# Patient Record
Sex: MALE | Race: WHITE | NOT HISPANIC OR LATINO | Employment: FULL TIME | URBAN - METROPOLITAN AREA
[De-identification: names, ages, dates, MRNs, and addresses within clinical notes are randomized per-mention and may not be internally consistent; named-entity substitution may affect disease eponyms.]

---

## 2018-12-02 ENCOUNTER — HOSPITAL ENCOUNTER (OUTPATIENT)
Dept: RADIOLOGY | Facility: MEDICAL CENTER | Age: 28
End: 2018-12-02
Attending: NURSE PRACTITIONER
Payer: COMMERCIAL

## 2018-12-02 ENCOUNTER — OFFICE VISIT (OUTPATIENT)
Dept: URGENT CARE | Facility: MEDICAL CENTER | Age: 28
End: 2018-12-02
Payer: COMMERCIAL

## 2018-12-02 VITALS
SYSTOLIC BLOOD PRESSURE: 118 MMHG | TEMPERATURE: 98.7 F | BODY MASS INDEX: 24.51 KG/M2 | DIASTOLIC BLOOD PRESSURE: 68 MMHG | HEART RATE: 61 BPM | HEIGHT: 74 IN | WEIGHT: 191 LBS | OXYGEN SATURATION: 99 %

## 2018-12-02 DIAGNOSIS — S20.211A CONTUSION OF RIGHT CHEST WALL, INITIAL ENCOUNTER: ICD-10-CM

## 2018-12-02 DIAGNOSIS — M25.512 ACUTE PAIN OF LEFT SHOULDER: ICD-10-CM

## 2018-12-02 DIAGNOSIS — R07.89 ANTERIOR CHEST WALL PAIN: ICD-10-CM

## 2018-12-02 DIAGNOSIS — S43.402A SPRAIN OF LEFT SHOULDER, UNSPECIFIED SHOULDER SPRAIN TYPE, INITIAL ENCOUNTER: ICD-10-CM

## 2018-12-02 PROCEDURE — 73030 X-RAY EXAM OF SHOULDER: CPT | Mod: LT

## 2018-12-02 PROCEDURE — 71046 X-RAY EXAM CHEST 2 VIEWS: CPT

## 2018-12-02 PROCEDURE — 99203 OFFICE O/P NEW LOW 30 MIN: CPT | Performed by: NURSE PRACTITIONER

## 2018-12-03 ASSESSMENT — ENCOUNTER SYMPTOMS
SHORTNESS OF BREATH: 0
WHEEZING: 0
CHILLS: 0
FALLS: 1
HEADACHES: 0
SENSORY CHANGE: 0
LOSS OF CONSCIOUSNESS: 0
NECK PAIN: 0
ORTHOPNEA: 0
SPUTUM PRODUCTION: 0
FOCAL WEAKNESS: 0
BACK PAIN: 0
DIZZINESS: 0
COUGH: 0
DIAPHORESIS: 0
FEVER: 0
WEAKNESS: 0
TINGLING: 0
SPEECH CHANGE: 0
PALPITATIONS: 0
HEMOPTYSIS: 0

## 2018-12-03 NOTE — PROGRESS NOTES
"Subjective:      Rivera Comer is a 28 y.o. male who presents with Arm Injury (crashed snowboarding at 10 AM, cant move it, pain from shoulder to sternum and ribs)            Patient reports that he was snowboarding today.  He fell and landed on his left arm and chest wall.  He notes left shoulder pain with any movement, as well as right anterior chest wall pain.  He has not taken any medicine to treat the symptoms.  No shortness of breath.  He was wearing a helmet.  He did not strike his head or lose consciousness.  He has a history of right AC joint separation but nothing on the left.  Rates pain 8/10.        Review of Systems   Constitutional: Negative for chills, diaphoresis, fever and malaise/fatigue.   Respiratory: Negative for cough, hemoptysis, sputum production, shortness of breath and wheezing.    Cardiovascular: Positive for chest pain. Negative for palpitations, orthopnea and leg swelling.   Musculoskeletal: Positive for falls and joint pain. Negative for back pain and neck pain.   Neurological: Negative for dizziness, tingling, sensory change, speech change, focal weakness, loss of consciousness, weakness and headaches.     Medications, Allergies, and current problem list reviewed today in Epic     Objective:     /68 (BP Location: Right arm, Patient Position: Sitting, BP Cuff Size: Adult)   Pulse 61   Temp 37.1 °C (98.7 °F) (Temporal)   Ht 1.88 m (6' 2\")   Wt 86.6 kg (191 lb)   SpO2 99%   BMI 24.52 kg/m²      Physical Exam   Constitutional: He is oriented to person, place, and time. He appears well-developed and well-nourished. He appears distressed.   Eyes: Pupils are equal, round, and reactive to light. EOM are normal.   Neck: Normal range of motion. Neck supple. No JVD present. No tracheal deviation present. No thyromegaly present.   Cardiovascular: Normal rate, regular rhythm and normal heart sounds.  Exam reveals no gallop and no friction rub.    No murmur heard.  Pulmonary/Chest: " Effort normal and breath sounds normal. No stridor. No respiratory distress. He has no wheezes. He has no rales. He exhibits tenderness. He exhibits no mass, no laceration, no crepitus, no edema, no deformity, no swelling and no retraction.       Right anterior chest wall is warm, dry, and intact with no bruising, swelling, erythema, rash or lesion.  Generalized TTP with wincing on palpation.   Musculoskeletal:        Left shoulder: He exhibits decreased range of motion, tenderness, bony tenderness, pain, spasm and decreased strength. He exhibits no swelling, no effusion, no crepitus, no deformity, no laceration and normal pulse.        Arms:  Left shoulder is warm, dry, and intact with no bruising, swelling, erythema, rash or lesion. Diffuse TTP with palpable trapezius muscle spasm at joint attachment.  No clavicle or scapula TTP.  Patient reluctant to perform ROM secondary to pain.  Distal NV, sensation and strength intact.   intact.  Cap refill < 2 seconds.   Lymphadenopathy:     He has no cervical adenopathy.   Neurological: He is alert and oriented to person, place, and time.   Skin: He is not diaphoretic.   Vitals reviewed.         [facility]  [id number]  [place] [address] Rivera Comer  MRN: [id number], : 1990, Sex: M  Adm: 2018, D/C: 2018   Order   DX-SHOULDER 2+ LEFT [[id number]] (Order [id number])   Reviewed by PARUL Rosario on 2018  3:11 PM   Images     Show images for DX-SHOULDER 2+ LEFT   View [employer]     DX-SHOULDER 2+ LEFT (Order #[id number]) on 18   Narrative       2018 2:35 PM    HISTORY/REASON FOR EXAM:  ; Pain/Deformity Following Trauma;  Pain/Deformity Following Trauma      TECHNIQUE/EXAM DESCRIPTION AND NUMBER OF VIEWS:  3 views of the LEFT shoulder.    COMPARISON: None    FINDINGS:  There is no evidence of fracture or dislocation. Glenohumeral and acromioclavicular articulation is maintained. Sclerotic  focus in the proximal left humerus likely represents a bone island. Visualized left lung field is clear. There is a sclerotic focus   involving the third rib on the left likely representing a bone island.     Impression       No evidence of acute fracture or dislocation.   Reading Provider Reading Date   Mirta Taveras M.D. Dec 2, 2018   Signing Provider Signing Date Signing Time   Mirta Taveras M.D. Dec 2, 2018  2:59 PM   Lab Information     Lab   RADIOLOGY              Last Resulted Time   Sun Dec 2, 2018  3:01 PM   Detailed Information     Priority and Order Details           Collection Information     Resulting Agency: RADIOLOGY      Order-Level Documents:     There are no order-level documents.   Order Detail Report     DX-SHOULDER 2+ LEFT (Order #[id number]) on 18   Order-Level Documents:     There are no order-level documents.   Encounter-Level Documents:     Scan on 2018 2:28 PM by Rosales Whitecan on 2018 2:28 PM by Rosales Bennett          Order-Level Documents for Parent Order:     There are no order-level documents for parent order.   Encounter-Level Documents for Parent Order:     There are no encounter-level documents for parent order.   DX-SHOULDER 2+ LEFT [[id number]]     Electronically signed by: PARUL Wolfe on 18 1405 Status: Completed   Ordering user: PARUL Wolfe 18 1405 Authorized by: PARUL Wolfe   Frequency:  18 1425 - 1  Occurrences   Diagnoses  Acute pain of left shoulder [M25.512]   Questionnaire     Question Answer   Left/Right Indicator: LEFT   Reason For Exam: Pain/Deformity Following Trauma   Comments to Radiology Fell while snowboarding.  Anterior and superior shoulder region pain.     [facility]  [id number]  [place] [address] Rivera Comer  MRN: [id number], : 1990, Sex: M  Adm: 2018, D/C: 2018   Order   DX-CHEST-2 VIEWS [[id number]] (Order  [id number])   Reviewed by PARUL Rosario on 12/2/2018  3:11 PM   Images     Show images for DX-CHEST-2 VIEWS   View [employer]     DX-CHEST-2 VIEWS (Order #[id number]) on 12/2/18   Narrative       12/2/2018 2:34 PM    HISTORY/REASON FOR EXAM:  Pain following trauma.    TECHNIQUE/EXAM DESCRIPTION:  PA and lateral views of the chest.    COMPARISON:  None    FINDINGS:    The heart is not enlarged. No focal consolidation, pleural effusion or pneumothorax is identified.  Costophrenic angles are clear.   Impression       No acute cardiopulmonary process is identified.   Reading Provider Reading Date   Mirta Taveras M.D. Dec 2, 2018   Signing Provider Signing Date Signing Time   Mirta Taveras M.D. Dec 2, 2018  2:58 PM   Lab Information     Lab   RADIOLOGY              Last Resulted Time   Sun Dec 2, 2018  3:00 PM   Detailed Information     Priority and Order Details           Collection Information     Resulting Agency: RADIOLOGY      Order-Level Documents:     There are no order-level documents.   Order Detail Report     DX-CHEST-2 VIEWS (Order #[id number]) on 12/2/18   Order-Level Documents:     There are no order-level documents.   Encounter-Level Documents:     There are no encounter-level documents.   Order-Level Documents for Parent Order:     There are no order-level documents for parent order.   Encounter-Level Documents for Parent Order:     There are no encounter-level documents for parent order.   DX-CHEST-2 VIEWS [[id number]]     Electronically signed by: PARUL Wolfe on 12/02/18 1405 Status: Completed   Ordering user: PARUL Wolfe 12/02/18 1405 Authorized by: PARUL Wolfe   Frequency:  12/02/18 1424 - 1  Occurrences   Diagnoses  Anterior chest wall pain [R07.89]   Questionnaire     Question Answer   Reason For Exam: Pain Following Trauma   Comments to Radiology Right anterior chest wall pain after fall while snowboarding.           Assessment/Plan:     1. Sprain of left shoulder, unspecified shoulder sprain type, initial encounter    2. Contusion of right chest wall, initial encounter    3. Acute pain of left shoulder  - DX-SHOULDER 2+ LEFT; Future    4. Anterior chest wall pain  - DX-CHEST-2 VIEWS; Future    Discussed exam findings with patient and reviewed imaging results.  Differential discussed.  OTC analgesics, ice, and rest prn comfort measures.  Arm sling/shoulder immobilizer provided for short term use not to exceed 3 days.  While using shoulder immobilizer, perform non-weight bearing arm circumduction exercises to prevent stiffness.  Advance activity as tolerated.  Follow up in 4-5 days if symptoms persist without improvement, sooner if worse.  ED precautions discussed.  Patient declined Norco citing he cannot take it due to his job.    Patient verbalized understanding of and agreed with plan of care.